# Patient Record
Sex: FEMALE | Race: ASIAN | NOT HISPANIC OR LATINO | URBAN - METROPOLITAN AREA
[De-identification: names, ages, dates, MRNs, and addresses within clinical notes are randomized per-mention and may not be internally consistent; named-entity substitution may affect disease eponyms.]

---

## 2022-11-23 ENCOUNTER — APPOINTMENT (RX ONLY)
Dept: URBAN - METROPOLITAN AREA OTHER 5 | Facility: OTHER | Age: 62
Setting detail: DERMATOLOGY
End: 2022-11-23

## 2022-11-23 DIAGNOSIS — Z41.1 ENCOUNTER FOR COSMETIC SURGERY: ICD-10-CM

## 2022-11-23 PROCEDURE — ? CONSULTATION - AGING FACE

## 2022-11-23 PROCEDURE — ? PATIENT SPECIFIC COUNSELING

## 2022-11-23 ASSESSMENT — LOCATION ZONE DERM: LOCATION ZONE: FACE

## 2022-11-23 ASSESSMENT — LOCATION DETAILED DESCRIPTION DERM: LOCATION DETAILED: LEFT INFERIOR CENTRAL MALAR CHEEK

## 2022-11-23 ASSESSMENT — LOCATION SIMPLE DESCRIPTION DERM: LOCATION SIMPLE: LEFT CHEEK

## 2022-11-23 NOTE — PROCEDURE: PATIENT SPECIFIC COUNSELING
Detail Level: Zone
Other (Free Text): 60y/o female presents today for consultation face regarding possible fat grafting to nasolabial folds\\n-

## 2022-11-23 NOTE — PROCEDURE: CONSULTATION - AGING FACE
Consultation Charge $ (Use Numbers Only, No Text Please.): 125
Count Minor/Major Decisions Toward Mdm (Not Cosmetic)?: No
Detail Level: Detailed

## 2022-11-23 NOTE — HPI: FACE (AGING FACE)
How Severe Is It?: moderate
Is This A New Presentation, Or A Follow-Up?: Aging Face
Additional History: Patient presents today for consultation- face - fat injections\\n Referral YouTube video by Dr. Jeremiah Hay - [place]